# Patient Record
Sex: MALE | Race: WHITE | NOT HISPANIC OR LATINO | Employment: OTHER | URBAN - METROPOLITAN AREA
[De-identification: names, ages, dates, MRNs, and addresses within clinical notes are randomized per-mention and may not be internally consistent; named-entity substitution may affect disease eponyms.]

---

## 2018-05-09 NOTE — PATIENT DISCUSSION
Patient presents with a large cystic mass on the right upper eyelid. Explained the need for excision with suture closure. Discussed the r/b of the procedure with the patient. Patient understands and will follow up on a Friday afternoon for lesion removal and suture closure.

## 2018-05-09 NOTE — PATIENT DISCUSSION
PHOTOGRAPHS: I have reviewed the external ocular photographs of this patient which show the following: large cystic mass on the right upper eyelid.

## 2018-05-18 NOTE — PATIENT DISCUSSION
The patient had a lesion on the right upper eyelid. After informed consent the lesion was anesthetized with local anesthetic, 1% lidocane with epinephrine 1:100,000 units. Sterile technique was used to remove the lesion with [place] scissors. Closure of the defect of 6mm required multiple interrupted sutures using 6.0 prolene. Antibiotic ointment was used to treat the area where lesion was removed. Lesion was sent to pathology for analysis. The patient was given written post operative wound care instructions and a prescription for antibiotic ointment. The patient was asked to call  within 10 days if they had not been otherwise called by our office with the result of the biopsy.

## 2018-05-29 NOTE — PATIENT DISCUSSION
Also, please do not hesitate to call us if you have any concerns not addressed by this information. Please call [phone] and we will do everything we can to help you during this period.

## 2018-09-07 NOTE — PATIENT DISCUSSION
CATARACTS, OU - SURGERY NOT RECOMMENDED AT THIS TIME. EXPLAINED THAT NEW GLS SEEM TO IMPROVE THE VA AND ADVISED PT TO RETURN TO REFERRING PHYSICIAN FOR UPDATED RX.  WILL REVISIT SX IF GLS DO NOT PROVIDE SATISFACTORY VA.

## 2022-03-04 ENCOUNTER — NEW PATIENT (OUTPATIENT)
Dept: URBAN - METROPOLITAN AREA CLINIC 47 | Facility: CLINIC | Age: 77
End: 2022-03-04

## 2022-03-04 DIAGNOSIS — H35.3111: ICD-10-CM

## 2022-03-04 DIAGNOSIS — H43.813: ICD-10-CM

## 2022-03-04 DIAGNOSIS — H54.7: ICD-10-CM

## 2022-03-04 DIAGNOSIS — H35.3221: ICD-10-CM

## 2022-03-04 DIAGNOSIS — H25.813: ICD-10-CM

## 2022-03-04 PROCEDURE — 92134 CPTRZ OPH DX IMG PST SGM RTA: CPT

## 2022-03-04 PROCEDURE — 99203 OFFICE O/P NEW LOW 30 MIN: CPT

## 2022-03-04 ASSESSMENT — TONOMETRY
OS_IOP_MMHG: 18
OD_IOP_MMHG: 18

## 2022-03-04 ASSESSMENT — VISUAL ACUITY
OD_CC: 20/25
OS_PH: 20/30
OS_CC: 20/40-1

## 2022-03-09 ENCOUNTER — CONSULTATION/EVALUATION (OUTPATIENT)
Dept: URBAN - METROPOLITAN AREA CLINIC 43 | Facility: CLINIC | Age: 77
End: 2022-03-09

## 2022-03-09 DIAGNOSIS — H43.813: ICD-10-CM

## 2022-03-09 DIAGNOSIS — H35.732: ICD-10-CM

## 2022-03-09 DIAGNOSIS — H35.3221: ICD-10-CM

## 2022-03-09 DIAGNOSIS — H35.3112: ICD-10-CM

## 2022-03-09 DIAGNOSIS — H35.363: ICD-10-CM

## 2022-03-09 DIAGNOSIS — H35.721: ICD-10-CM

## 2022-03-09 PROCEDURE — C9257 BEVACIZUMAB INJECTION: HCPCS

## 2022-03-09 PROCEDURE — 99214 OFFICE O/P EST MOD 30 MIN: CPT

## 2022-03-09 PROCEDURE — 67028 INJECTION EYE DRUG: CPT

## 2022-03-09 PROCEDURE — 92250 FUNDUS PHOTOGRAPHY W/I&R: CPT

## 2022-03-09 ASSESSMENT — VISUAL ACUITY
OU_SC: 20/30-2
OS_SC: 20/70
OD_SC: 20/30-2

## 2022-03-09 ASSESSMENT — TONOMETRY
OS_IOP_MMHG: 12
OD_IOP_MMHG: 13

## 2022-03-09 NOTE — PROCEDURE NOTE: CLINICAL
PROCEDURE NOTE: Avastin () OS. Diagnosis: Neovascular AMD with Active CNV. Anesthesia: 2% Lidocaine. Prep: Betadine Drops and Betadine Scrub. Prior to the original injection, risks/benefits/alternatives discussed including infection, loss of vision, hemorrhage, cataract, glaucoma, retinal tears or detachment. A written consent is on file, and the need for today’s injection was discussed and the patient is understanding and wishes to proceed. The off-label status of Intravitreal Avastin also was reviewed. The patient wished to proceed with treatment. The patient wished to proceed with treatment. Topical anesthetic drops were applied to the eye. Betadine prep was performed. Surgical mask worn. Sterile drape and lid speculum were applied. Using the syringe provided, Avastin 1.25 mg in 0.05 cc was injected into the vitreous cavity. Injection site: 3-4 mm from the limbus. Patient tolerated procedure well. Following the intravitreal injection, the sterile lid speculum was removed. CRA perfusion confirmed. CF vision checked. Patient given office phone number/answering service number and advised to call immediately should there be an increase in floaters or redness, loss of vision or pain, or should they have any other questions or concerns. Nemo Rodriguez

## 2022-04-06 ENCOUNTER — ESTABLISHED PATIENT (OUTPATIENT)
Dept: URBAN - METROPOLITAN AREA CLINIC 43 | Facility: CLINIC | Age: 77
End: 2022-04-06

## 2022-04-06 DIAGNOSIS — H35.3112: ICD-10-CM

## 2022-04-06 DIAGNOSIS — H35.732: ICD-10-CM

## 2022-04-06 DIAGNOSIS — H35.3221: ICD-10-CM

## 2022-04-06 DIAGNOSIS — H35.363: ICD-10-CM

## 2022-04-06 DIAGNOSIS — H35.721: ICD-10-CM

## 2022-04-06 PROCEDURE — 92250 FUNDUS PHOTOGRAPHY W/I&R: CPT

## 2022-04-06 PROCEDURE — 99213 OFFICE O/P EST LOW 20 MIN: CPT

## 2022-04-06 PROCEDURE — 67028 INJECTION EYE DRUG: CPT

## 2022-04-06 PROCEDURE — C9257 BEVACIZUMAB INJECTION: HCPCS

## 2022-04-06 ASSESSMENT — TONOMETRY
OS_IOP_MMHG: 11
OD_IOP_MMHG: 15

## 2022-04-06 ASSESSMENT — VISUAL ACUITY
OS_SC: 20/60
OD_SC: 20/25+1
OS_PH: 20/30-2

## 2022-04-06 NOTE — PATIENT DISCUSSION
An examination that was significantly and separately identifiable from the procedure performed today was also completed for this Macular Drusen.

## 2022-04-06 NOTE — PATIENT DISCUSSION
An examination that was significantly and separately identifiable from the procedure performed today was also completed for this Dry AMD.

## 2022-04-06 NOTE — PROCEDURE NOTE: CLINICAL
PROCEDURE NOTE: Avastin () OS. Diagnosis: Neovascular AMD with Active CNV. Anesthesia: 2% Lidocaine. Prep: Betadine Drops and Betadine Scrub. Prior to the original injection, risks/benefits/alternatives discussed including infection, loss of vision, hemorrhage, cataract, glaucoma, retinal tears or detachment. A written consent is on file, and the need for today’s injection was discussed and the patient is understanding and wishes to proceed. The off-label status of Intravitreal Avastin also was reviewed. The patient wished to proceed with treatment. The patient wished to proceed with treatment. Topical anesthetic drops were applied to the eye. Betadine prep was performed. Surgical mask worn. Sterile drape and lid speculum were applied. Using the syringe provided, Avastin 1.25 mg in 0.05 cc was injected into the vitreous cavity. Injection site: 3-4 mm from the limbus. Patient tolerated procedure well. Following the intravitreal injection, the sterile lid speculum was removed. CRA perfusion confirmed. CF vision checked. Patient given office phone number/answering service number and advised to call immediately should there be an increase in floaters or redness, loss of vision or pain, or should they have any other questions or concerns. SCL injection given prior to injection. Catalina Grimes

## 2024-01-17 ENCOUNTER — ESTABLISHED PATIENT (OUTPATIENT)
Dept: URBAN - METROPOLITAN AREA CLINIC 43 | Facility: CLINIC | Age: 79
End: 2024-01-17

## 2024-01-17 DIAGNOSIS — H04.123: ICD-10-CM

## 2024-01-17 DIAGNOSIS — H35.30: ICD-10-CM

## 2024-01-17 DIAGNOSIS — H35.363: ICD-10-CM

## 2024-01-17 DIAGNOSIS — H43.813: ICD-10-CM

## 2024-01-17 DIAGNOSIS — H25.813: ICD-10-CM

## 2024-01-17 DIAGNOSIS — H35.721: ICD-10-CM

## 2024-01-17 DIAGNOSIS — H35.3112: ICD-10-CM

## 2024-01-17 DIAGNOSIS — H35.732: ICD-10-CM

## 2024-01-17 DIAGNOSIS — H35.3221: ICD-10-CM

## 2024-01-17 PROCEDURE — 99214 OFFICE O/P EST MOD 30 MIN: CPT

## 2024-01-17 PROCEDURE — 92250 FUNDUS PHOTOGRAPHY W/I&R: CPT

## 2024-01-17 PROCEDURE — J0178PRE EYLEA PREFILLED SYRINGE

## 2024-01-17 PROCEDURE — 67028 INJECTION EYE DRUG: CPT

## 2024-01-17 ASSESSMENT — TONOMETRY
OS_IOP_MMHG: 08
OD_IOP_MMHG: 09

## 2024-01-17 ASSESSMENT — VISUAL ACUITY
OS_CC: 20/50-1
OD_CC: 20/30-1
OS_PH: 20/30-1

## 2024-02-06 ENCOUNTER — CONSULTATION/EVALUATION (OUTPATIENT)
Dept: URBAN - METROPOLITAN AREA CLINIC 43 | Facility: CLINIC | Age: 79
End: 2024-02-06

## 2024-02-06 DIAGNOSIS — H25.813: ICD-10-CM

## 2024-02-06 PROCEDURE — 92025-3 CORNEAL TOPO, REFUSED

## 2024-02-06 PROCEDURE — 92136 OPHTHALMIC BIOMETRY: CPT

## 2024-02-06 PROCEDURE — 99204 OFFICE O/P NEW MOD 45 MIN: CPT

## 2024-02-06 RX ORDER — PREDNISOLONE ACETATE 10 MG/ML: 1 SUSPENSION/ DROPS OPHTHALMIC

## 2024-02-06 RX ORDER — MOXIFLOXACIN OPHTHALMIC 5 MG/ML: 1 SOLUTION/ DROPS OPHTHALMIC

## 2024-02-06 RX ORDER — KETOROLAC TROMETHAMINE 5 MG/ML: 1 SOLUTION OPHTHALMIC

## 2024-02-06 ASSESSMENT — VISUAL ACUITY
OS_BAT: 20/70
OD_CC: J2
OS_AM: 20/30
OD_SC: 20/25
OS_CC: J4
OD_SC: J12
OD_CC: 20/25-1
OD_BAT: 20/40
OS_SC: 20/70-1
OS_CC: 20/50+1

## 2024-02-06 ASSESSMENT — TONOMETRY
OD_IOP_MMHG: 14
OS_IOP_MMHG: 14

## 2024-02-26 ENCOUNTER — PRE-OP/H&P (OUTPATIENT)
Dept: URBAN - METROPOLITAN AREA CLINIC 39 | Facility: CLINIC | Age: 79
End: 2024-02-26

## 2024-02-26 ENCOUNTER — SURGERY/PROCEDURE (OUTPATIENT)
Facility: LOCATION | Age: 79
End: 2024-02-26

## 2024-02-26 DIAGNOSIS — H25.813: ICD-10-CM

## 2024-02-26 DIAGNOSIS — H35.30: ICD-10-CM

## 2024-02-26 DIAGNOSIS — H43.813: ICD-10-CM

## 2024-02-26 DIAGNOSIS — H35.363: ICD-10-CM

## 2024-02-26 DIAGNOSIS — H35.721: ICD-10-CM

## 2024-02-26 DIAGNOSIS — H35.732: ICD-10-CM

## 2024-02-26 DIAGNOSIS — H04.123: ICD-10-CM

## 2024-02-26 DIAGNOSIS — H35.3221: ICD-10-CM

## 2024-02-26 DIAGNOSIS — H35.3112: ICD-10-CM

## 2024-02-26 PROCEDURE — 99211HP PRE-OP

## 2024-02-26 PROCEDURE — 66984 XCAPSL CTRC RMVL W/O ECP: CPT

## 2024-02-27 ENCOUNTER — PRE-OP/H&P (OUTPATIENT)
Dept: URBAN - METROPOLITAN AREA CLINIC 43 | Facility: CLINIC | Age: 79
End: 2024-02-27

## 2024-02-27 DIAGNOSIS — Z96.1: ICD-10-CM

## 2024-02-27 PROCEDURE — 99024 POSTOP FOLLOW-UP VISIT: CPT

## 2024-02-27 ASSESSMENT — VISUAL ACUITY
OD_PH: 20/25
OD_SC: 20/30-1
OS_SC: 20/80-1

## 2024-02-27 ASSESSMENT — TONOMETRY
OD_IOP_MMHG: 14
OS_IOP_MMHG: 13

## 2024-03-11 ENCOUNTER — ESTABLISHED PATIENT (OUTPATIENT)
Dept: URBAN - METROPOLITAN AREA CLINIC 43 | Facility: CLINIC | Age: 79
End: 2024-03-11

## 2024-03-11 DIAGNOSIS — H35.721: ICD-10-CM

## 2024-03-11 DIAGNOSIS — H35.30: ICD-10-CM

## 2024-03-11 DIAGNOSIS — H35.3221: ICD-10-CM

## 2024-03-11 DIAGNOSIS — H35.732: ICD-10-CM

## 2024-03-11 DIAGNOSIS — H35.363: ICD-10-CM

## 2024-03-11 DIAGNOSIS — H35.3112: ICD-10-CM

## 2024-03-11 PROCEDURE — 92250 FUNDUS PHOTOGRAPHY W/I&R: CPT

## 2024-03-11 PROCEDURE — J0178PRE EYLEA PREFILLED SYRINGE

## 2024-03-11 PROCEDURE — 67028 INJECTION EYE DRUG: CPT

## 2024-03-11 PROCEDURE — 99214 OFFICE O/P EST MOD 30 MIN: CPT

## 2024-03-11 ASSESSMENT — VISUAL ACUITY
OS_PH: 20/30-2
OD_SC: 20/20-2
OS_SC: 20/40-1+1

## 2024-03-11 ASSESSMENT — TONOMETRY
OS_IOP_MMHG: 10
OD_IOP_MMHG: 08

## 2024-04-01 ENCOUNTER — COMPREHENSIVE EXAM (OUTPATIENT)
Dept: URBAN - METROPOLITAN AREA CLINIC 43 | Facility: CLINIC | Age: 79
End: 2024-04-01

## 2024-04-01 DIAGNOSIS — H35.3112: ICD-10-CM

## 2024-04-01 DIAGNOSIS — H25.811: ICD-10-CM

## 2024-04-01 DIAGNOSIS — H35.3221: ICD-10-CM

## 2024-04-01 DIAGNOSIS — H52.7: ICD-10-CM

## 2024-04-01 PROCEDURE — 92014 COMPRE OPH EXAM EST PT 1/>: CPT

## 2024-04-01 PROCEDURE — 92015 DETERMINE REFRACTIVE STATE: CPT

## 2024-04-01 ASSESSMENT — VISUAL ACUITY
OS_SC: 20/30-2
OS_SC: J6-
OD_SC: 20/30-1
OD_SC: J12
OD_BAT: 20/400
OS_CC: J1-
OS_CC: 20/200
OD_CC: J1
OD_CC: 20/25-1

## 2024-04-01 ASSESSMENT — TONOMETRY
OS_IOP_MMHG: 11
OD_IOP_MMHG: 14

## 2025-03-05 ENCOUNTER — COMPREHENSIVE EXAM (OUTPATIENT)
Age: 80
End: 2025-03-05

## 2025-03-05 DIAGNOSIS — H35.3221: ICD-10-CM

## 2025-03-05 DIAGNOSIS — H35.3112: ICD-10-CM

## 2025-03-05 DIAGNOSIS — H25.811: ICD-10-CM

## 2025-03-05 DIAGNOSIS — H52.7: ICD-10-CM

## 2025-03-05 PROCEDURE — 92134 CPTRZ OPH DX IMG PST SGM RTA: CPT

## 2025-03-05 PROCEDURE — 92015 DETERMINE REFRACTIVE STATE: CPT

## 2025-03-05 PROCEDURE — 92014 COMPRE OPH EXAM EST PT 1/>: CPT | Mod: 25
